# Patient Record
Sex: FEMALE | Race: WHITE
[De-identification: names, ages, dates, MRNs, and addresses within clinical notes are randomized per-mention and may not be internally consistent; named-entity substitution may affect disease eponyms.]

---

## 2018-01-06 NOTE — ASMTCMCOM
CM Note

 

CM Note                       

Notes:

Chart reviewed.  Patient presents to ER s/p witnessed seizure at [facility].  Patient is 

sleepy, arousable, and able to tell me that she takes "generic Keppra".  She is unable to recall 

when she took her last dose or what that dose is. She has had prescriptions filled at the [facility] 


on [address] [place] [address] in [place] [phone], and I have called to confirm:  last prescription was 

filled on December 19th for Levetirachetam 500 mg BID # 15.  Mable VIDES informed.



Patient tells me that she can and will fill a new prescription if she is given one a discharge.  

 

Date Signed:  01/06/2018 11:48 AM

Electronically Signed By:Elena Menard RN

## 2018-01-06 NOTE — EDPHY
H & P


Smoking Status: Current every day smoker


Time Seen by Provider: 01/06/18 09:51


HPI/ROS: 





CHIEF COMPLAINT:  Seizure





HISTORY OF PRESENT ILLNESS:  37-year-old female presents to the emergency 

department by ambulance after having a witnessed seizure this morning out from 

the shelter.  Apparently she had another seizure last night.  Patient has a 

known seizure disorder and thinks that she takes Keppra.  She does not think 

that she has missed any doses although she is not sure.  She drinks occasional 

alcohol.  She has a mild headache otherwise has no physical complaints.  No 

chest pain or difficulty breathing.  No abdominal pain.  No injury to her upper 

lower extremities. Per EMS the patient was confused consistent with being 

postictal.  She was not incontinent of urine.





REVIEW OF SYSTEMS:


Constitutional:  No fever, no chills.


Eyes:  No double or blurry vision.


ENT:  No sore throat.


Respiratory:  No cough, no shortness of breath.


Cardiac:  No chest pain.


Gastrointestinal:  No abdominal pain, vomiting or diarrhea.


Genitourinary:  No dysuria.


Musculoskeletal:  No neck or back pain.


Skin:  No rashes.


Neurological: headache. (Marge Warda MARY)


Past Medical/Surgical History: 





Seizure disorder (Mable Ward)


Social History: 





Homeless staying at the shelter (Mable Ward)


Physical Exam: 





General Appearance:  Alert, no distress. The patient has a visible signs of 

trauma to her head.  She is confused.


Eyes:  Pupils equal and round.  Extraocular motions are all intact.


ENT:  Mouth:  Mucous membranes moist. Superficial abrasion to the right lateral 

aspect of her tongue.  No dental injury or malocclusion.


Respiratory:  No wheezing, rhonchi, or rales, lungs are clear to auscultation.


Cardiovascular:  Regular rate and rhythm.


Gastrointestinal:  Abdomen is soft and nontender, no masses, no rebound or 

guarding, bowel sounds normal.


Neurological:  Alert and oriented x 3, cranial nerves II through XII grossly 

intact


Skin:  Warm and dry, no rashes.


Musculoskeletal:  Nontender to palpate along the cervical, thoracic or lumbar 

spine.  Neck is supple.


Extremities:  Full range of motion and no peripheral edema.


Psychiatric:  Patient is oriented X 3, there is no agitation. (Mable Ward)


Constitutional: 


 Initial Vital Signs











Temperature (C)  37.4 C   01/06/18 09:50


 


Heart Rate  88   01/06/18 09:50


 


Respiratory Rate  18   01/06/18 09:50


 


Blood Pressure  112/85 H  01/06/18 09:50


 


O2 Sat (%)  96   01/06/18 09:50








 











O2 Delivery Mode               Room Air














Allergies/Adverse Reactions: 


 





No Known Allergies Allergy (Unverified 01/06/18 10:07)


 








Home Medications: 














 Medication  Instructions  Recorded


 


Keppra  01/06/18


 


levETIRAcetam [Keppra 500 mg (*)] 500 mg PO BID #30 tab 01/06/18














Medical Decision Making


ED Course/Re-evaluation: 





37-year-old female presents to the emergency department with known seizure 

disorder.  She is prescribed Keppra.  She was given loading dose of 1 g of 

Keppra IV in the emergency department since she reportedly had a seizure last 

night as well.  Patient was also given a prescription for Keppra 500 mg twice 

daily for 2 weeks which was confirmed by  that this was indeed the 

medication she is prescribed.  She was instructed not to drive and to follow up 

with her primary care provider as well as a neurologist.





Patient was given 1 mg of IV Ativan.  Had no recurring seizure activity.





Upon discharge the patient is mentating normally and answering questions 

appropriately is comfortable being discharged home. (Mable Ward)


Differential Diagnosis: 





Seizure including but not limited to electrolyte abnormality, alcohol withdrawal

, medication noncompliance, head injury, and breakthrough seizure. (Mable Ward)


Other Provider: 





PHYSICIAN DOCUMENTATION:


The patient was evaluated and managed by the Physician Assistant and myself.  I 

have reviewed the chart and agree with the findings and plan of care as 

documented.  In addition, I examined the patient myself at on arrival.  History 

confirmed as seizure last night and then today. Physical findings as follows:  

Alert and follows commands now.





Will restart on her anti epileptic medications.  Ambulatory without ataxia in 

the emergency department.





I am the secondary supervising physician. (Helio Ortiz)





- Data Points


Laboratory Results: 


 Laboratory Results





 01/06/18 10:02 





 01/06/18 10:02 





 











  01/06/18 01/06/18 01/06/18





  10:02 10:02 10:02


 


WBC      9.19 10^3/uL 10^3/uL





     (3.80-9.50) 


 


RBC      4.46 10^6/uL 10^6/uL





     (4.18-5.33) 


 


Hgb      14.5 g/dL g/dL





     (12.6-16.3) 


 


Hct      42.4 % %





     (38.0-47.0) 


 


MCV      95.1 fL fL





     (81.5-99.8) 


 


MCH      32.5 pg pg





     (27.9-34.1) 


 


MCHC      34.2 g/dL g/dL





     (32.4-36.7) 


 


RDW      12.3 % %





     (11.5-15.2) 


 


Plt Count      223 10^3/uL 10^3/uL





     (150-400) 


 


MPV      9.5 fL fL





     (8.7-11.7) 


 


Neut % (Auto)      Not Reported 





    


 


Lymph % (Auto)      Not Reported 





    


 


Mono % (Auto)      Not Reported 





    


 


Eos % (Auto)      Not Reported 





    


 


Baso % (Auto)      Not Reported 





    


 


Nucleat RBC Rel Count      0.0 % %





     (0.0-0.2) 


 


Absolute Neuts (auto)      Not Reported 





    


 


Absolute Lymphs (auto)      Not Reported 





    


 


Absolute Monos (auto)      Not Reported 





    


 


Absolute Eos (auto)      Not Reported 





    


 


Absolute Basos (auto)      Not Reported 





    


 


Absolute Nucleated RBC      0.00 10^3/uL 10^3/uL





     (0-0.01) 


 


Immature Gran %      Not Reported 





    


 


Seg Neutrophils %      74 % %





    


 


Band Neutrophils %      4 % %





    


 


Lymphocytes %      16 % %





    


 


Monocytes %      6 % %





    


 


Immature Gran #      Not Reported 





    


 


Absolute Seg Neuts      6.80 10^/uL H 10^/uL





     (1.70-6.50) 


 


Absolute Band Neuts      0.37 10^3/uL 10^3/uL





     (0.00-0.70) 


 


Absolute Lymphocytes      1.47 10^3/uL 10^3/uL





     (1.00-3.00) 


 


Absolute Monocytes      0.55 10^3/uL 10^3/uL





     (0.30-0.80) 


 


RBC/WBC/PLT Morphology      NORMAL 





     (NORMAL) 


 


Atypical Lymphocytes      2+  H 





    


 


Platelet Estimate      ADEQUATE 





     (ADEQ) 


 


Smear Review By      Pending 





    


 


Sodium    144 mEq/L mEq/L  





    (134-144)  


 


Potassium    4.0 mEq/L mEq/L  





    (3.5-5.2)  


 


Chloride    107 mEq/L mEq/L  





    ()  


 


Carbon Dioxide    19 mEq/l L mEq/l  





    (22-31)  


 


Anion Gap    18 mEq/L H mEq/L  





    (8-16)  


 


BUN    7 mg/dL mg/dL  





    (7-23)  


 


Creatinine    0.8 mg/dL mg/dL  





    (0.6-1.0)  


 


Estimated GFR    > 60   





    


 


Glucose    97 mg/dL mg/dL  





    ()  


 


Calcium    9.4 mg/dL mg/dL  





    (8.5-10.4)  


 


Beta HCG, Qual  NEGATIVE     





    











Medications Given: 


 








Discontinued Medications





Sodium Chloride (Ns)  1,000 mls @ 0 mls/hr IV ONCE ONE


   PRN Reason: Wide Open


   Stop: 01/06/18 10:01


   Last Admin: 01/06/18 10:15 Dose:  1,000 mls


Levetiracetam 1,000 mg/ Sodium (Chloride)  110 mls @ 440 mls/hr IV EDNOW ONE


   Stop: 01/06/18 11:28


   Last Admin: 01/06/18 11:55 Dose:  110 mls


Lorazepam (Ativan Injection)  1 mg IVP EDNOW ONE


   Stop: 01/06/18 10:01


   Last Admin: 01/06/18 10:16 Dose:  1 mg








Departure





- Departure


Disposition: Home, Routine, Self-Care


Clinical Impression: 


 Seizure disorder





Condition: Good


Instructions:  Recurrent Seizures in Adults (ED)


Additional Instructions: 


Take your Keppra as prescribed.  Return to the emergency department if you 

developed recurring seizure activity or any other concerns.  You should follow 

up with a neurologist regarding her seizures.  You should not drive a car go 

swimming until cleared by a neurologist.


Referrals: 


Feliz Arzate DO [Medical Doctor] - As per Instructions (Neurologist on-call)


Prescriptions: 


levETIRAcetam [Keppra 500 mg (*)] 500 mg PO BID #30 tab
1

## 2018-02-21 ENCOUNTER — HOSPITAL ENCOUNTER (EMERGENCY)
Dept: HOSPITAL 80 - FED | Age: 38
Discharge: HOME | End: 2018-02-21
Payer: MEDICAID

## 2018-02-21 VITALS
HEART RATE: 78 BPM | TEMPERATURE: 98.1 F | SYSTOLIC BLOOD PRESSURE: 117 MMHG | DIASTOLIC BLOOD PRESSURE: 78 MMHG | OXYGEN SATURATION: 98 %

## 2018-02-21 VITALS — DIASTOLIC BLOOD PRESSURE: 81 MMHG | RESPIRATION RATE: 16 BRPM | SYSTOLIC BLOOD PRESSURE: 113 MMHG | HEART RATE: 75 BPM

## 2018-02-21 VITALS
OXYGEN SATURATION: 94 % | HEART RATE: 69 BPM | DIASTOLIC BLOOD PRESSURE: 81 MMHG | TEMPERATURE: 98.1 F | SYSTOLIC BLOOD PRESSURE: 99 MMHG | RESPIRATION RATE: 16 BRPM

## 2018-02-21 VITALS — RESPIRATION RATE: 18 BRPM

## 2018-02-21 DIAGNOSIS — F17.200: ICD-10-CM

## 2018-02-21 DIAGNOSIS — G40.909: Primary | ICD-10-CM

## 2018-02-21 DIAGNOSIS — Z76.5: ICD-10-CM

## 2018-02-21 DIAGNOSIS — Z59.0: Primary | ICD-10-CM

## 2018-02-21 DIAGNOSIS — F44.5: Primary | ICD-10-CM

## 2018-02-21 LAB — PLATELET # BLD: 249 10^3/UL (ref 150–400)

## 2018-02-21 PROCEDURE — G0480 DRUG TEST DEF 1-7 CLASSES: HCPCS

## 2018-02-21 SDOH — ECONOMIC STABILITY - HOUSING INSECURITY: HOMELESSNESS: Z59.0

## 2018-02-21 NOTE — EDPHY
H & P


Smoking Status: Current every day smoker


Time Seen by Provider: 02/21/18 16:48


HPI/ROS: 





CHIEF COMPLAINT: "I think she had a seizure"





HISTORY OF PRESENT ILLNESS:  37-year-old homeless female arrives via ambulance 

after her partner called 911 because she was at the bus stop and she had a 

possible seizure.  No head injury.  No fall.  No incontinence.  No confusion.  

No postictal state.





Patient was seen emergency department earlier today and evaluated at that time.

  At that time there were concerns over possible malingering behavior by the ER 

provider.  The patient's partner informs me that he is concerned that she may 

be developing dementia, states that she will sometimes wander for days and he 

would like her to be admitted so that "somebody can watch over her". 





 No oral trauma.  No incontinence.  No head injury.





PRIMARY CARE PROVIDER: [facility] 





REVIEW OF SYSTEMS:


A ten point review of systems was performed and is negative with the exception 

of the items mentioned in the HPI








PAST MEDICAL & SURGICAL  HISTORY:  History of seizure versus pseudo-seizure.  

On daily Keppra 500 mg twice daily which he has been compliant with





SOCIAL HISTORY: Homeless.    














************


PHYSICAL EXAM





(Prior to examination, patient consented to physical exam, hands were washed 

and my usual and customary physical exam procedures followed)


1) GENERAL: Well-developed, well-nourished, alert and oriented.  Appears to be 

in no acute distress.  She is answering questions appropriately.  Smiling.  GCS 

15


2) HEAD: Normocephalic, atraumatic.  No raccoon eyes no Ocampo sign.


3) HEENT: Pupils equal, round, reactive to light bilaterally.  Sclera 

anicteric.  Nasopharynx, oropharynx, clear, no lesions.  No rhinorrhea.  No 

hemotympanum.  No fluid or blood in the EAC.  No trauma.  No laceration or 

abrasion Ears bilaterally with normal tympanic membranes.


4) NECK: Full range of motion, no meningeal signs.


5) LUNGS: Clear auscultation bilaterally, no wheezes, no rhonchi, no 

retractions.   


6) HEART: Regular rate and rhythm, no murmur, no heave, no gallop.


7) ABDOMEN: No guarding, no rebound, no focal tenderness, negative McBurney's, 

negative Nogueira's, negative Rovsing's, negative peritoneal sign,


8) MUSCULOSKELETAL: Moving all extremities, no focal areas of tenderness, no 

obvious trauma.  No peripheral edema or discoloration.


9) BACK: No CVA tenderness, no midline vertebral tenderness, no fluctuance, no 

step-off, no obvious trauma, no visual or palpable abnormality. 


10) SKIN: No rash, no petechiae. 


11) Psychiatric:  Patient is oriented X 3, there is no agitation.


12) NEURO: Awake, alert, and oriented to person, place and time.  Answers 

questions appropriately.  There were no obvious focal neurologic abnormalities.

  No cerebellar dysfunction.  Cranial nerves 2 through to 12 intact.  Normal 

steady gait.  Upper and lower extremities bilaterally with strength 5 / 5, 

reflexes 2+.





***************





DIFFERENTIAL DIAGNOSIS:  In no particular order including but limited to seizure

, status epilepticus, pseudo-seizure, malingering 


 (GEORGIA Daniels)


Constitutional: 


 Initial Vital Signs











Heart Rate  75   02/21/18 17:01


 


Respiratory Rate  16   02/21/18 17:01


 


Blood Pressure  113/81 H  02/21/18 17:01








 











O2 Delivery Mode               Room Air














Allergies/Adverse Reactions: 


 





No Known Allergies Allergy (Verified 02/21/18 17:05)


 








Home Medications: 














 Medication  Instructions  Recorded


 


Keppra  01/06/18


 


levETIRAcetam [Keppra 500 mg (*)] 500 mg PO BID #30 tab 01/06/18


 


LEVETIRACETAM [Keppra 1000 mg] 1,000 mg PO BID #60 tab 02/21/18














MDM/Departure





- [facility]


ED Course/Re-evaluation: 





I did not see this patient while she was in the emergency department.  However 

her care was discussed with the PA while the patient was in the department.  I 

agree with treatment plan and management (Marcio Salamanca)





I reviewed this patient's medical records.  This is her 3rd emergency 

department visit for same complaint today.  She is answering questions 

appropriately, not postictal, no evidence of trauma.  At this time I do not 

identify indication for admission as it is not completely clear whether this 

patient had a true seizure or not.  The emergency department physician earlier 

today has expressed concerns over possible malingering behavior.  The patient's 

boyfriend would like her to be admitted so that "somebody can watch over her".  

I informed him that this would be an inappropriate use of hospital resources.  

I have empathized with their homeless situation.  I recommend she remain 

compliant with her Keppra and [facility]  Care of 

patient under supervision of  secondary supervising physician Dr Marcio Salamanca . 

 (GEORGIA Daniels)





- Depart


Disposition: Home, Routine, Self-Care


Clinical Impression: 


 possible seizure





Condition: Good


Instructions:  Nonepileptic Seizures (ED)


Referrals: 


[facility],. [Clinic] - As per Instructions

## 2018-02-21 NOTE — EDPHY
H & P


Time Seen by Provider: 02/21/18 11:45





- Medical/Surgical History


Hx Asthma: No


Hx Chronic Respiratory Disease: No


Hx Diabetes: No


Hx Cardiac Disease: No


Hx Renal Disease: No


Hx Cirrhosis: No


Hx Alcoholism: No


Hx HIV/AIDS: No


Hx Splenectomy or Spleen Trauma: No


Other PMH: siezure





- Social History


Smoking Status: Current every day smoker


Allergies/Adverse Reactions: 


 





No Known Allergies Allergy (Unverified 01/06/18 10:07)


 








Home Medications: 














 Medication  Instructions  Recorded


 


Keppra  01/06/18


 


levETIRAcetam [Keppra 500 mg (*)] 500 mg PO BID #30 tab 01/06/18


 


LEVETIRACETAM [Keppra 1000 mg] 1,000 mg PO BID #60 tab 02/21/18














Medical Decision Making


ED Course/Re-evaluation: 





CHIEF COMPLAINT:  Spacey at the bus stop





HISTORY OF PRESENT ILLNESS:  37-year-old female who was just here in the 

emergency department.  She has a questionable history of seizure disorder.  She 

was at the bus stop and was acting spacey according to some bystander so they 

called EMS and she was transported here.  There has been no evidence of seizure 

activity.  There has been no witnessed seizure activity.  And she has no 

complaints.





REVIEW OF SYSTEMS:  





A 10 point review of systems was performed and is negative with the exception 

of the elements mentioned in the history of present illness.





PHYSICAL EXAM:  





HR, BP, O2 Sat, RR.  Temp noted


General Appearance:  Alert, well hydrated, appropriate, and non-toxic appearing.


Head:  Atraumatic without scalp tenderness or obvious injury


Eyes:  Pupils equal, round, reactive to light and accommodation, EOMI, no trauma

, no injection.


Ears:  Clear bilaterally, no perforation, normal landmarks


Nose:  Atraumatic, no rhinorrhea, clear.


Throat:  There is no erythema or exudates, no lesions, normal tonsils, mucus 

membranes moist.


Neck:  Supple, 2+ carotid upstroke, nontender, no lymphadenopathy.


Respiratory:  No retractions, no distress, no wheezes, and no accessory muscle 

use.  Lungs are clear to auscultation bilaterally.


Cardiovascular:  Regular rate and rhythm, no murmurs, rubs, or gallops. 

Bilateral carotid, radial, dorsalis pedis, and posterior tibial pulses intact. 

Good capillary refill all extremities.


Gastrointestinal:  Abdomen is soft, nontender, non-distended, no masses, no 

rebound, no guarding, no peritoneal signs.


Musculoskeletal:  Normal active ROM of all extremities, atraumatic.


Neurological:  Alert, appropriate, and interactive.  The patient has normal 

DTRs and non-focal cranial nerves, motor, sensory, and cerebellar exam.


Skin:  No rashes, good turgor, no nodules on palpation.





Past medical history:  Seizures were pseudoseizures versus malingering


Past surgical history:  Noncontributory


Family history:  Noncontributory


Social history:  Homeless, single, not employed, abuses tobacco drugs and 

alcohol








DIFFERENTIAL DIAGNOSIS:   The differential diagnosis for the patient's seizure 

included but was not limited to electrolyte abnormality, alcohol withdrawal, 

medication noncompliance, head injury, CNS structural abnormality, and break 

through seizure.





MEDICAL DECISION MAKING:  This patient has no evidence of having a seizure.  

She was just urinate just checked her labs for the same problem she has no 

anion gap and no physical evidence of seizure.  She just does not answer people'

s questions so they got concerned and she keeps getting transported to the 

emergency department.  She is essentially malingering.  I am not discharging 

her again.  





Departure





- Departure


Disposition: Home, Routine, Self-Care


Clinical Impression: 


 Malingering, Pseudoseizures





Condition: Good


Additional Instructions: 


Follow up with Neurology as stated


Referrals: 


Patient,NotPresent [Primary Care Provider] - As per Instructions

## 2018-02-21 NOTE — EDPHY
H & P


Time Seen by Provider: 18 08:11





- Medical/Surgical History


Hx Asthma: No


Hx Chronic Respiratory Disease: No


Hx Diabetes: No


Hx Cardiac Disease: No


Hx Renal Disease: No


Hx Cirrhosis: No


Hx Alcoholism: No


Hx HIV/AIDS: No


Hx Splenectomy or Spleen Trauma: No


Other PMH: siezure





- Social History


Smoking Status: Current every day smoker


Constitutional: 


 Initial Vital Signs











Temperature (C)  36.7 C   18 08:18


 


Heart Rate  85   18 08:18


 


Respiratory Rate  16   18 08:18


 


Blood Pressure  99/74 L  18 08:18


 


O2 Sat (%)  96   18 08:18








 











O2 Delivery Mode               Room Air














Allergies/Adverse Reactions: 


 





No Known Allergies Allergy (Unverified 18 10:07)


 








Home Medications: 














 Medication  Instructions  Recorded


 


Keppra  18


 


levETIRAcetam [Keppra 500 mg (*)] 500 mg PO BID #30 tab 18


 


LEVETIRACETAM [Keppra 1000 mg] 1,000 mg PO BID #60 tab 18














Medical Decision Making


ED Course/Re-evaluation: 





CHIEF COMPLAINT:  Seizure 





HISTORY OF PRESENT ILLNESS: 





This patient is a 38 y/o female arriving via EMS for evaluation of reported 

seizure activity. The patient reports diagnosis of epilepsy one year ago and 

takes Keppra BID. When she woke this morning at the shelter, she noted she was 

incontinent of urine. She states witnesses told her she had tonic-clonic 

seizure activity with generalized shaking. She reported confusion and 

disorientation upon waking, but feels well now. He last seizure was two days 

go. She reports she is taking her Keppra daily as directed. She was evaluated 

at [facility] about one month ago and discharged without diagnosis. She 

admits to marijuana use, denies alcohol or illicit drug use. She denies 

striking her head or any trauma. No fever, vomiting, numbness, weakness, or 

other associated symptoms.  





REVIEW OF SYSTEMS:  





A 10 point review of systems was performed and is negative with the exception 

of the elements mentioned in the history of present illness.





PHYSICAL EXAM:  





HR, BP, O2 Sat, RR.  Temp noted


General Appearance:  Alert, well hydrated, appropriate, and non-toxic appearing.


Head:  Atraumatic without scalp tenderness or obvious injury


Eyes:  Pupils equal, round, reactive to light and accommodation, EOMI, no trauma

, no injection.


Ears:  Clear bilaterally, no perforation, normal landmarks


Nose:  Atraumatic, no rhinorrhea, clear.


Throat: No tongue bites. There is no erythema or exudates, no lesions, normal 

tonsils, mucus membranes moist.


Neck:  Supple, 2+ carotid upstroke, nontender, no lymphadenopathy.


Respiratory:  No retractions, no distress, no wheezes, and no accessory muscle 

use.  Lungs are clear to auscultation bilaterally.


Cardiovascular:  Regular rate and rhythm, no murmurs, rubs, or gallops. 

Bilateral carotid, radial, dorsalis pedis, and posterior tibial pulses intact. 

Good capillary refill all extremities.


Gastrointestinal:  Abdomen is soft, nontender, non-distended, no masses, no 

rebound, no guarding, no peritoneal signs.


Musculoskeletal:  Normal active ROM of all extremities, atraumatic.


Neurological:  Alert, appropriate, and interactive.  The patient has normal 

DTRs and non-focal cranial nerves, motor, sensory, and cerebellar exam.


Skin:  No rashes, good turgor, no nodules on palpation.





Past medical history: Seizures. 


Past surgical history: Noncontributory 


Family history: Noncontributory. 


Social history: Currently homeless. Marijuana use. Occasional illicit drug use. 





DIFFERENTIAL DIAGNOSIS:   





The differential diagnosis for the patient's seizure included but was not 

limited to electrolyte abnormality, alcohol withdrawal, medication noncompliance

, head injury, CNS structural abnormality, and break through seizure.





MEDICAL DECISION MAKIN y/o female presents following reported seizure activity this morning. Exam 

unremarkable. Plan for labs including CBC, chemistries, EtOH, drug screen, 

BHCG. 





09:19 Reassessed patient. She states she is feeling good. 





EtOH negative. No anion gap. Laboratory studies otherwise unremarkable. 





Plan to discharge home in good condition. Plan to increase Keppra dosage 500BID 

to 1000 BID. Referral to neurology given, return precautions discussed. The 

patient is comfortable with this plan. 





- Data Points


Laboratory Results: 


 Laboratory Results





 18 08:30 





 18 08:30 





 











  18





  08:30 08:30 08:30


 


WBC      





    


 


RBC      





    


 


Hgb      





    


 


Hct      





    


 


MCV      





    


 


MCH      





    


 


MCHC      





    


 


RDW      





    


 


Plt Count      





    


 


MPV      





    


 


Neut % (Auto)      





    


 


Lymph % (Auto)      





    


 


Mono % (Auto)      





    


 


Eos % (Auto)      





    


 


Baso % (Auto)      





    


 


Nucleat RBC Rel Count      





    


 


Absolute Neuts (auto)      





    


 


Absolute Lymphs (auto)      





    


 


Absolute Monos (auto)      





    


 


Absolute Eos (auto)      





    


 


Absolute Basos (auto)      





    


 


Absolute Nucleated RBC      





    


 


Immature Gran %      





    


 


Immature Gran #      





    


 


Sodium      142 mEq/L mEq/L





     (135-145) 


 


Potassium      4.5 mEq/L mEq/L





     (3.5-5.2) 


 


Chloride      105 mEq/L mEq/L





     () 


 


Carbon Dioxide      24 mEq/l mEq/l





     (22-31) 


 


Anion Gap      13 mEq/L mEq/L





     (8-16) 


 


BUN      9 mg/dL mg/dL





     (7-23) 


 


Creatinine      0.7 mg/dL mg/dL





     (0.6-1.0) 


 


Estimated GFR      > 60 





    


 


Glucose      95 mg/dL mg/dL





     () 


 


Calcium      10.0 mg/dL mg/dL





     (8.5-10.4) 


 


Beta HCG, Qual    NEGATIVE   





    


 


Ethyl Alcohol  < 10 mg/dL mg/dL    





   (0-10)   














  18





  08:30


 


WBC  8.49 10^3/uL 10^3/uL





   (3.80-9.50) 


 


RBC  4.67 10^6/uL 10^6/uL





   (4.18-5.33) 


 


Hgb  14.6 g/dL g/dL





   (12.6-16.3) 


 


Hct  43.4 % %





   (38.0-47.0) 


 


MCV  92.9 fL fL





   (81.5-99.8) 


 


MCH  31.3 pg pg





   (27.9-34.1) 


 


MCHC  33.6 g/dL g/dL





   (32.4-36.7) 


 


RDW  12.7 % %





   (11.5-15.2) 


 


Plt Count  249 10^3/uL 10^3/uL





   (150-400) 


 


MPV  8.7 fL fL





   (8.7-11.7) 


 


Neut % (Auto)  76.2 % H %





   (39.3-74.2) 


 


Lymph % (Auto)  15.2 % %





   (15.0-45.0) 


 


Mono % (Auto)  6.9 % %





   (4.5-13.0) 


 


Eos % (Auto)  0.5 % L %





   (0.6-7.6) 


 


Baso % (Auto)  0.6 % %





   (0.3-1.7) 


 


Nucleat RBC Rel Count  0.0 % %





   (0.0-0.2) 


 


Absolute Neuts (auto)  6.47 10^3/uL 10^3/uL





   (1.70-6.50) 


 


Absolute Lymphs (auto)  1.29 10^3/uL 10^3/uL





   (1.00-3.00) 


 


Absolute Monos (auto)  0.59 10^3/uL 10^3/uL





   (0.30-0.80) 


 


Absolute Eos (auto)  0.04 10^3/uL 10^3/uL





   (0.03-0.40) 


 


Absolute Basos (auto)  0.05 10^3/uL 10^3/uL





   (0.02-0.10) 


 


Absolute Nucleated RBC  0.00 10^3/uL 10^3/uL





   (0-0.01) 


 


Immature Gran %  0.6 % %





   (0.0-1.1) 


 


Immature Gran #  0.05 10^3/uL 10^3/uL





   (0.00-0.10) 


 


Sodium  





  


 


Potassium  





  


 


Chloride  





  


 


Carbon Dioxide  





  


 


Anion Gap  





  


 


BUN  





  


 


Creatinine  





  


 


Estimated GFR  





  


 


Glucose  





  


 


Calcium  





  


 


Beta HCG, Qual  





  


 


Ethyl Alcohol  





  














Departure





- Departure


Disposition: Home, Routine, Self-Care


Clinical Impression: 


 Seizure, Possible seizure





Condition: Good


Instructions:  Recurrent Seizures in Adults (ED)


Additional Instructions: 


1. Take Keppra as prescribed for seizure prevention, 1000mg twice daily. 





2. Follow up with neurology in 2-3 days for further evaluation. 





3. Return to the emergency for repeat seizure within 24 hours, seizure lasting 

more than five minutes, fever, weakness, numbness, or other worsening of 

condition.


  


4. No driving or dangerous activity such as swimming in a pool or riding a ski 

lift which could put you or someone else a danger in the event of recurrent 

seizure.


Referrals: 


Rod Madera MD [Medical Doctor] - As per Instructions


Prescriptions: 


LEVETIRACETAM [Keppra 1000 mg] 1,000 mg PO BID #60 tab


Report Scribed for: Fermín Newby


Report Scribed by: Crystal Mccracken


Date of Report: 18


Time of Report: 09:29

## 2018-03-12 ENCOUNTER — HOSPITAL ENCOUNTER (EMERGENCY)
Dept: HOSPITAL 80 - FED | Age: 38
Discharge: HOME | End: 2018-03-12
Payer: MEDICAID

## 2018-03-12 VITALS — OXYGEN SATURATION: 100 %

## 2018-03-12 VITALS — DIASTOLIC BLOOD PRESSURE: 70 MMHG | RESPIRATION RATE: 16 BRPM | SYSTOLIC BLOOD PRESSURE: 98 MMHG | HEART RATE: 105 BPM

## 2018-03-12 DIAGNOSIS — F11.10: Primary | ICD-10-CM

## 2018-03-12 DIAGNOSIS — F17.200: ICD-10-CM

## 2018-03-12 LAB
CK SERPL-CCNC: 177 IU/L (ref 0–156)
PLATELET # BLD: 213 10^3/UL (ref 150–400)

## 2018-03-12 PROCEDURE — G0480 DRUG TEST DEF 1-7 CLASSES: HCPCS
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
## 2018-04-17 ENCOUNTER — HOSPITAL ENCOUNTER (EMERGENCY)
Dept: HOSPITAL 80 - FED | Age: 38
Discharge: HOME | End: 2018-04-17
Payer: MEDICAID

## 2018-04-17 VITALS — SYSTOLIC BLOOD PRESSURE: 119 MMHG | DIASTOLIC BLOOD PRESSURE: 76 MMHG

## 2018-04-17 DIAGNOSIS — G40.909: Primary | ICD-10-CM

## 2018-04-17 LAB — PLATELET # BLD: 249 10^3/UL (ref 150–400)
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
## 2018-04-17 NOTE — CPEKG
Heart Rate: 78

RR Interval: 769

P-R Interval: 156

QRSD Interval: 84

QT Interval: 364

QTC Interval: 415

P Axis: 61

QRS Axis: 51

T Wave Axis: 61

EKG Severity - NORMAL ECG -

EKG Impression: SINUS RHYTHM

Electronically Signed By: Sandro Grarido 17-Apr-2018 14:31:07